# Patient Record
Sex: FEMALE | Race: WHITE | URBAN - METROPOLITAN AREA
[De-identification: names, ages, dates, MRNs, and addresses within clinical notes are randomized per-mention and may not be internally consistent; named-entity substitution may affect disease eponyms.]

---

## 2023-04-05 ENCOUNTER — APPOINTMENT (OUTPATIENT)
Dept: PRIMARY CARE | Facility: CLINIC | Age: 58
End: 2023-04-05

## 2024-10-03 ENCOUNTER — NURSING HOME VISIT (OUTPATIENT)
Dept: POST ACUTE CARE | Facility: EXTERNAL LOCATION | Age: 59
End: 2024-10-03

## 2024-10-03 DIAGNOSIS — R62.7 FAILURE TO THRIVE IN ADULT: ICD-10-CM

## 2024-10-03 DIAGNOSIS — N17.9 AKI (ACUTE KIDNEY INJURY) (CMS-HCC): ICD-10-CM

## 2024-10-03 DIAGNOSIS — F41.9 ANXIETY: Primary | ICD-10-CM

## 2024-10-03 DIAGNOSIS — K59.09 OTHER CONSTIPATION: ICD-10-CM

## 2024-10-03 DIAGNOSIS — Z91.199 NON-COMPLIANCE: ICD-10-CM

## 2024-10-03 ASSESSMENT — ENCOUNTER SYMPTOMS
RESPIRATORY NEGATIVE: 1
CONSTIPATION: 1
PSYCHIATRIC NEGATIVE: 1
MUSCULOSKELETAL NEGATIVE: 1
WEAKNESS: 1
CARDIOVASCULAR NEGATIVE: 1
FATIGUE: 1

## 2024-10-03 NOTE — LETTER
Patient: Rosa Maria Schaefer  : 1965    Encounter Date: 10/03/2024    Subjective  Patient ID: Rosa Maria Schaefer is a 59 y.o. female.    Patient is a 59-year-old female with past medical history of failure to thrive, diabetes, depression, anxiety, obesity, recent PRO, who recently presented to [facility] due to underlying generalized weakness and confusion.  This was found to be secondary to high blood sugars, and patient was admitted for overall medical management.  Patient overall improved with control of her blood sugars, and was recommended discharge to SNF in stable condition.  She initially was very resistant to discharge to a skilled nursing facility, however was agreeable.  Patient was discharged yesterday, reports that she has been having persistent issues with constipation however did have a bowel movement yesterday which showed much improvement of symptoms.  Otherwise, she endorses that she would like to be discharged home today.  She voices understanding with potential risks of being discharged as she would have to private pay for aids, as she does not have insurance as well as private pay for any sort of DME equipment and social work would not be allowed to set her up with this she voices understanding with regards to this and the risks associated with this and prefers to be discharged.  On discharge coordination, social work noted that patient had a active case open with APS due to concerns of living situation.  In addition to this, patient was advised not to be sent home alone without support.  Due to this, discharge was canceled.  Staff updated with care plan.        Review of Systems   Constitutional:  Positive for fatigue.   HENT: Negative.     Respiratory: Negative.     Cardiovascular: Negative.    Gastrointestinal:  Positive for constipation.   Musculoskeletal: Negative.    Neurological:  Positive for weakness.   Psychiatric/Behavioral: Negative.         ObjectiveVitals Reviewed via facility  EMR   Physical Exam  Constitutional:       General: She is not in acute distress.     Appearance: She is not ill-appearing.   Eyes:      Pupils: Pupils are equal, round, and reactive to light.   Cardiovascular:      Rate and Rhythm: Normal rate and regular rhythm.      Pulses: Normal pulses.      Heart sounds: No murmur heard.  Pulmonary:      Effort: No respiratory distress.      Breath sounds: No wheezing.   Abdominal:      General: Abdomen is flat. Bowel sounds are normal. There is no distension.   Musculoskeletal:      Right lower leg: No edema.      Left lower leg: No edema.   Skin:     General: Skin is warm and dry.   Neurological:      Mental Status: She is alert. Mental status is at baseline.      Cranial Nerves: No cranial nerve deficit.      Motor: Weakness present.   Psychiatric:         Mood and Affect: Mood normal.         Behavior: Behavior normal.         Assessment/Plan  Diagnoses and all orders for this visit:  Anxiety  PRO (acute kidney injury) (CMS-HCC)  Other constipation  Failure to thrive in adult  Non-compliance  Patient seen and examined on hospital discharge.  Hospital course reviewed and medications reconciled, as per HPI, patient was preferring discharge back home, she did voice capacity about decisions with regards to this, however due to patient having an open APS case against her, advise AMA discharge.  She prefers to be discharged back to the hospital, we will help facilitate this.  Otherwise, continue supportive care continue optimize bowel regimen.  Closely monitor vitals.  No changes at this time.  Staff and social work updated with care plan.    Reviewed and approved by ELIOT WAKEFIELD on 10/3/24 at 9:08 PM.         Electronically Signed By: Eliot Wakefield DO   10/3/24  9:08 PM

## 2024-10-04 NOTE — PROGRESS NOTES
Subjective   Patient ID: Rosa Maria Schaefer is a 59 y.o. female.    Patient is a 59-year-old female with past medical history of failure to thrive, diabetes, depression, anxiety, obesity, recent PRO, who recently presented to [facility] due to underlying generalized weakness and confusion.  This was found to be secondary to high blood sugars, and patient was admitted for overall medical management.  Patient overall improved with control of her blood sugars, and was recommended discharge to SNF in stable condition.  She initially was very resistant to discharge to a skilled nursing facility, however was agreeable.  Patient was discharged yesterday, reports that she has been having persistent issues with constipation however did have a bowel movement yesterday which showed much improvement of symptoms.  Otherwise, she endorses that she would like to be discharged home today.  She voices understanding with potential risks of being discharged as she would have to private pay for aids, as she does not have insurance as well as private pay for any sort of DME equipment and social work would not be allowed to set her up with this she voices understanding with regards to this and the risks associated with this and prefers to be discharged.  On discharge coordination, social work noted that patient had a active case open with APS due to concerns of living situation.  In addition to this, patient was advised not to be sent home alone without support.  Due to this, discharge was canceled.  Staff updated with care plan.        Review of Systems   Constitutional:  Positive for fatigue.   HENT: Negative.     Respiratory: Negative.     Cardiovascular: Negative.    Gastrointestinal:  Positive for constipation.   Musculoskeletal: Negative.    Neurological:  Positive for weakness.   Psychiatric/Behavioral: Negative.         Objective Vitals Reviewed via facility EMR   Physical Exam  Constitutional:       General: She is not in  acute distress.     Appearance: She is not ill-appearing.   Eyes:      Pupils: Pupils are equal, round, and reactive to light.   Cardiovascular:      Rate and Rhythm: Normal rate and regular rhythm.      Pulses: Normal pulses.      Heart sounds: No murmur heard.  Pulmonary:      Effort: No respiratory distress.      Breath sounds: No wheezing.   Abdominal:      General: Abdomen is flat. Bowel sounds are normal. There is no distension.   Musculoskeletal:      Right lower leg: No edema.      Left lower leg: No edema.   Skin:     General: Skin is warm and dry.   Neurological:      Mental Status: She is alert. Mental status is at baseline.      Cranial Nerves: No cranial nerve deficit.      Motor: Weakness present.   Psychiatric:         Mood and Affect: Mood normal.         Behavior: Behavior normal.         Assessment/Plan   Diagnoses and all orders for this visit:  Anxiety  PRO (acute kidney injury) (CMS-HCC)  Other constipation  Failure to thrive in adult  Non-compliance  Patient seen and examined on hospital discharge.  Hospital course reviewed and medications reconciled, as per HPI, patient was preferring discharge back home, she did voice capacity about decisions with regards to this, however due to patient having an open APS case against her, advise AMA discharge.  She prefers to be discharged back to the hospital, we will help facilitate this.  Otherwise, continue supportive care continue optimize bowel regimen.  Closely monitor vitals.  No changes at this time.  Staff and social work updated with care plan.    Reviewed and approved by CHARLA WAKEFIELD on 10/3/24 at 9:08 PM.

## 2025-03-21 ENCOUNTER — APPOINTMENT (OUTPATIENT)
Dept: PRIMARY CARE | Facility: CLINIC | Age: 60
End: 2025-03-21